# Patient Record
(demographics unavailable — no encounter records)

---

## 2024-11-12 NOTE — PROCEDURE
[FreeTextEntry3] : Nasal Endoscopy: inferior turbinate hypertrophy -> improvement w decongestion nasal airways clear mild b/l septal deviation no mucopus or polyps

## 2024-11-12 NOTE — PHYSICAL EXAM
[FreeTextEntry1] : overweight [de-identified] : thick, short [Nasal Endoscopy Performed] : nasal endoscopy was performed, see procedure section for findings [Midline] : trachea located in midline position [de-identified] : very crowded opx, large tongue base [Normal] : no rashes

## 2024-11-12 NOTE — HISTORY OF PRESENT ILLNESS
[de-identified] : 58M here for initial evaluation.  He c/o nasal congestion/obstruction most apparent at night when sometimes he is completely unable to breathe from his nose. Both sides are affected equally and obstruction alternates in severity between the two. He has been on astelin/flonase and zyrtec the past 4 months with some improvement, in addition to allergy shots, but he remains symptomatic. He has had in-office procedures on his nose in the past but does not remember exactly what, but feels they improved his breathing but only very temporarily.  He has snoring and bad sleep quality and weight gain. Takes celebrex as needed for pain.  CT Sinus done - I do not have access to it right now.  ROS otherwise unremarkable.

## 2024-11-12 NOTE — ASSESSMENT
[FreeTextEntry1] : 58M here for initial evaluation. He c/o nasal congestion/obstruction most apparent at night when sometimes he is completely unable to breathe from his nose. Both sides are affected equally and obstruction alternates in severity between the two. He has been on astelin/flonase and zyrtec the past 4 months with some improvement, in addition to allergy shots, but he remains symptomatic. He has had in-office procedures on his nose in the past but does not remember exactly what, but feels they improved his breathing but only very temporarily. He has snoring and bad sleep quality and weight gain. CT was done, but I have not seen it. On exam, pt is overweight w crowded oropharynx. Nasal endoscopy shows inferior turbinate hypertrophy and mild b/l septal deviation. The rest of the head and neck exam is unremarkable. Nasal obstruction due to turbinate hypertrophy. In effort to reliably improve nasal breathing, can offer turbinate reduction - this was discussed and all questions answered and he would like to proceed. Notwithstanding nasal obstruction, there is clearly some degree of JANNETH - this should be worked up w [facility] and sleep consultation as well.

## 2024-12-27 NOTE — PROCEDURE
[FreeTextEntry3] : Nasal Endoscopy: inferior turbinate hypertrophy -> improvement w decongestion nasal airways clear mild b/l septal deviation no mucopus or polyps  Turbinate Reduction: informed consent nasal cavities tropicalized w afrin/lidocaine turbinates infiltrated w 1%lido/epi both turbinates forcefully outfractured submucus resection w coblator wand hemostasis w afrin tolerated well

## 2024-12-27 NOTE — HISTORY OF PRESENT ILLNESS
[de-identified] : 58M here in followup  He is here for planned turbinate reduction. He c/o nasal congestion/obstruction most apparent at night when sometimes he is completely unable to breathe from his nose. Both sides are affected equally and obstruction alternates in severity between the two. He has been on astelin/flonase and zyrtec the past 4 months with some improvement, in addition to allergy shots, but he remains symptomatic. He has had in-office procedures on his nose in the past but does not remember exactly what, but feels they improved his breathing but only very temporarily.  He has snoring and bad sleep quality and weight gain. Takes celebrex as needed for pain.  CT Sinus 5/5/23 (I reviewed): -paranasal sinuses clear -turbinate hypertrophy  ROS otherwise unremarkable.

## 2024-12-27 NOTE — PHYSICAL EXAM
[FreeTextEntry1] : overweight [de-identified] : thick, short [Nasal Endoscopy Performed] : nasal endoscopy was performed, see procedure section for findings [Midline] : trachea located in midline position [de-identified] : very crowded opx, large tongue base [Normal] : no rashes

## 2024-12-27 NOTE — ASSESSMENT
[FreeTextEntry1] : 58M here for planned turbinate reduction. He c/o nasal congestion/obstruction most apparent at night when sometimes he is completely unable to breathe from his nose. Both sides are affected equally and obstruction alternates in severity between the two. He has been on astelin/flonase and zyrtec the past 5 months with some improvement, in addition to allergy shots, but he remains symptomatic. He has had in-office procedures on his nose in the past but does not remember exactly what, but feels they improved his breathing but only very temporarily. He has snoring and bad sleep quality and weight gain. CT is as above. On exam, pt is overweight w crowded oropharynx. Nasal endoscopy shows inferior turbinate hypertrophy and mild b/l septal deviation. Submucous resection of both inferior turbinates was carried out utilizing coblation without issue.  -saline t/p day -motrin for pain -gentle noseblowing -RTO 2 weeks

## 2025-01-17 NOTE — PHYSICAL EXAM
[de-identified] : Bilateral inferior turbinates with massive crusting/scabbing, left harder than right. [Normal] : no neck adenopathy

## 2025-01-17 NOTE — ASSESSMENT
[FreeTextEntry1] : Mr. GOLDSTEIN has persistent nasal congestion after inferior turbinate reduction (Coblation) on 12/27/24. He only used saline spray once postop. His inferior turbinates are covered with large crust/scabs since he didnt use the saline. Recovered from what was a likely viral infection.  PLAN I urged him to use saline nasal spray 3x/day  F/up with Dr. Strong in 2 weeks

## 2025-01-17 NOTE — HISTORY OF PRESENT ILLNESS
[de-identified] : Mr. GOLDSTEIN underwent bilateral inferior turbinate reduction with Coblator by Dr. Strong on 12/27/2024. He has not followed up with the practice until today.  He only used saline spray on the evening of the procedure. He feels nasal congestion when he lies down. Had severe nasal congestion, thick rhinorrhea with blood streaks and loss of smell last month, resolved now.

## 2025-07-24 NOTE — PHYSICAL EXAM
[No Acute Distress] : no acute distress [Well Nourished] : well nourished [Well Developed] : well developed [Normal Oropharynx] : normal oropharynx [Enlarged Base of the Tongue] : enlarged base of the tongue [IV] : Mallampati Class: IV [No JVD] : no jvd [Normal Rate/Rhythm] : normal rate/rhythm [Normal S1, S2] : normal s1, s2 [No Murmurs] : no murmurs [No Resp Distress] : no resp distress [Clear to Auscultation Bilaterally] : clear to auscultation bilaterally [No Abnormalities] : no abnormalities [Benign] : benign [Soft] : soft [Normal Gait] : normal gait [No Clubbing] : no clubbing [No Cyanosis] : no cyanosis [FROM] : FROM [2+ Pitting] : 2+ pitting [Normal Color/ Pigmentation] : normal color/ pigmentation [No Focal Deficits] : no focal deficits [Oriented x3] : oriented x3 [Normal Affect] : normal affect [TextBox_44] : thick  short [TextBox_68] : diminished aeration  [TextBox_105] : thick legs

## 2025-07-24 NOTE — DISCUSSION/SUMMARY
[FreeTextEntry1] : 59 year old patient presents for evaluation of  history of  cough.  he also has chest congestion.  He has had procedure to address  hypertrophy of both inferior nasal turbinates in 2025 with partial relief.  residual  difficulty breathing through nose He was given an inhaler in past that seemed to help.    He does believe he has obstructive sleep apnea based on history of  heavy snoring and apparent apneic episodes  He states the cough is better  He denies wheeze, no history of  obstructive airway disease   he has significant GERD  and will undergo endoscopy  he has significant LE edema  thick extremities  Impression  cough, congestion:  possibly due to  postnasal drip in light of sinus problems  At risk for obstructive sleep apnea based on his report   LE edema   may be due to fluid retention in setting of hypoxia, possible obesity hypoventilation syndrome.  also consider possible venous insufficiency  He is on testosterone  replacement  that may worsen any obstructive sleep apnea  PLAN  Blood testing, check Hgb, Pro BNP, creatinine, serum bicarbonate  Order in facility sleep study, rule out obesity hypoventilation, hypercapnia other sleep disorder  continue nasal steroid, azelastine, saline nasal wash, follow with ENT   CXR  obtain information from his PCP/cardiologist  Agree with cardiology consult for echocardiogram to rule out cardiomyopathy or pulmonary HTN     Total time of the encounter: 45 minutes which included but was not limited to the following: Face-to-face and non face-to-face time personally spent by the physician preparing to see the patient, obtaining and/or resuming separately obtained history, performing a medically appropriate examination and/or evaluation, counseling and educating the patient/family/caregiver, ordering medications, tests or procedures, referring and communicating with other healthcare professionals, documenting clinical information in the electronic health record, independently interpreting results and communicated results to the patient/family/caregiver and care coordination.      Jose Ni MD

## 2025-07-24 NOTE — REASON FOR VISIT
[Consultation] : a consultation [Cough] : cough [Shortness of Breath] : shortness of breath [TextBox_44] : dyspnea on exertion